# Patient Record
Sex: FEMALE | Race: WHITE | NOT HISPANIC OR LATINO | URBAN - METROPOLITAN AREA
[De-identification: names, ages, dates, MRNs, and addresses within clinical notes are randomized per-mention and may not be internally consistent; named-entity substitution may affect disease eponyms.]

---

## 2019-11-18 ENCOUNTER — APPOINTMENT (RX ONLY)
Dept: URBAN - METROPOLITAN AREA CLINIC 74 | Facility: CLINIC | Age: 64
Setting detail: DERMATOLOGY
End: 2019-11-18

## 2019-11-18 ENCOUNTER — APPOINTMENT (RX ONLY)
Dept: URBAN - METROPOLITAN AREA CLINIC 73 | Facility: CLINIC | Age: 64
Setting detail: DERMATOLOGY
End: 2019-11-18

## 2019-11-18 DIAGNOSIS — L65.9 NONSCARRING HAIR LOSS, UNSPECIFIED: ICD-10-CM

## 2019-11-18 DIAGNOSIS — R21 RASH AND OTHER NONSPECIFIC SKIN ERUPTION: ICD-10-CM

## 2019-11-18 PROBLEM — E78.5 HYPERLIPIDEMIA, UNSPECIFIED: Status: ACTIVE | Noted: 2019-11-18

## 2019-11-18 PROBLEM — I10 ESSENTIAL (PRIMARY) HYPERTENSION: Status: ACTIVE | Noted: 2019-11-18

## 2019-11-18 PROBLEM — K21.9 GASTRO-ESOPHAGEAL REFLUX DISEASE WITHOUT ESOPHAGITIS: Status: ACTIVE | Noted: 2019-11-18

## 2019-11-18 PROBLEM — M12.9 ARTHROPATHY, UNSPECIFIED: Status: ACTIVE | Noted: 2019-11-18

## 2019-11-18 PROCEDURE — 11105 PUNCH BX SKIN EA SEP/ADDL: CPT

## 2019-11-18 PROCEDURE — 11104 PUNCH BX SKIN SINGLE LESION: CPT

## 2019-11-18 PROCEDURE — ? BIOPSY BY PUNCH METHOD

## 2019-11-18 PROCEDURE — ? TREATMENT REGIMEN

## 2019-11-18 PROCEDURE — ? COUNSELING

## 2019-11-18 PROCEDURE — 99202 OFFICE O/P NEW SF 15 MIN: CPT | Mod: 25

## 2019-11-18 PROCEDURE — ? PRESCRIPTION

## 2019-11-18 RX ORDER — CLOBETASOL PROPIONATE 0.5 MG/ML
FTU SOLUTION TOPICAL BID
Qty: 1 | Refills: 1 | Status: ERX | COMMUNITY
Start: 2019-11-18

## 2019-11-18 RX ADMIN — CLOBETASOL PROPIONATE FTU: 0.5 SOLUTION TOPICAL at 00:00

## 2019-11-18 ASSESSMENT — LOCATION SIMPLE DESCRIPTION DERM
LOCATION SIMPLE: RIGHT ELBOW
LOCATION SIMPLE: LEFT ELBOW
LOCATION SIMPLE: RIGHT SCALP
LOCATION SIMPLE: LEFT POSTERIOR THIGH
LOCATION SIMPLE: LEFT POSTERIOR THIGH
LOCATION SIMPLE: LEFT SCALP
LOCATION SIMPLE: LEFT ELBOW
LOCATION SIMPLE: RIGHT ELBOW
LOCATION SIMPLE: RIGHT SCALP
LOCATION SIMPLE: LEFT SCALP

## 2019-11-18 ASSESSMENT — LOCATION DETAILED DESCRIPTION DERM
LOCATION DETAILED: RIGHT CENTRAL FRONTAL SCALP
LOCATION DETAILED: RIGHT ELBOW
LOCATION DETAILED: LEFT ELBOW
LOCATION DETAILED: LEFT CENTRAL FRONTAL SCALP
LOCATION DETAILED: RIGHT ELBOW
LOCATION DETAILED: LEFT CENTRAL FRONTAL SCALP
LOCATION DETAILED: LEFT PROXIMAL POSTERIOR THIGH
LOCATION DETAILED: RIGHT CENTRAL FRONTAL SCALP
LOCATION DETAILED: LEFT PROXIMAL POSTERIOR THIGH
LOCATION DETAILED: LEFT ELBOW

## 2019-11-18 ASSESSMENT — LOCATION ZONE DERM
LOCATION ZONE: ARM
LOCATION ZONE: LEG
LOCATION ZONE: SCALP
LOCATION ZONE: ARM
LOCATION ZONE: SCALP
LOCATION ZONE: LEG

## 2019-11-18 NOTE — PROCEDURE: BIOPSY BY PUNCH METHOD
Additional Anesthesia Volume In Cc (Will Not Render If 0): 0
Epidermal Sutures: 4-0 Ethilon
Punch Size In Mm: 4
Biopsy Type: H and E
Render Post-Care Instructions In Note?: no
Hemostasis: None
Anesthesia Type: 1% lidocaine with epinephrine
Detail Level: Detailed
Post-Care Instructions: I reviewed with the patient in detail post-care instructions. Patient is to keep the biopsy site dry overnight, and then apply bacitracin twice daily until healed. Patient may apply hydrogen peroxide soaks to remove any crusting.
Home Suture Removal Text: Patient was provided a home suture removal kit and will remove their sutures at home.  If they have any questions or difficulties they will call the office.
Wound Care: Vaniply
Suture Removal: 10 days
Billing Type: Third-Party Bill
Was A Bandage Applied: Yes
Dressing: bandage
Consent: Written consent was obtained and risks were reviewed including but not limited to scarring, infection, bleeding, scabbing, incomplete removal, nerve damage and allergy to anesthesia.
Anesthesia Volume In Cc: 0.5
Path Notes (To The Dermatopathologist): For vertical sectioning
Path Notes (To The Dermatopathologist): For horizontal sectioning
Notification Instructions: Patient will be notified of biopsy results. However, patient instructed to call the office if not contacted within 2 weeks.

## 2019-11-18 NOTE — PROCEDURE: BIOPSY BY PUNCH METHOD
Patient Will Remove Sutures At Home?: No
Lab: [id number]
X Depth Of Punch In Cm (Optional): 0
Billing Type: Third-Party Bill
Epidermal Sutures: 4-0 Ethilon
Lab Facility: [id number]
Biopsy Type: H and E
Post-Care Instructions: I reviewed with the patient in detail post-care instructions. Patient is to keep the biopsy site dry overnight, and then apply bacitracin twice daily until healed. Patient may apply hydrogen peroxide soaks to remove any crusting.
Was A Bandage Applied: Yes
Hemostasis: None
Dressing: bandage
Path Notes (To The Dermatopathologist): For horizontal sectioning
Consent: Written consent was obtained and risks were reviewed including but not limited to scarring, infection, bleeding, scabbing, incomplete removal, nerve damage and allergy to anesthesia.
Notification Instructions: Patient will be notified of biopsy results. However, patient instructed to call the office if not contacted within 2 weeks.
Home Suture Removal Text: Patient was provided a home suture removal kit and will remove their sutures at home.  If they have any questions or difficulties they will call the office.
Anesthesia Type: 1% lidocaine with epinephrine
Punch Size In Mm: 4
Anesthesia Volume In Cc: 0.5
Wound Care: Vaniply
Suture Removal: 10 days
Detail Level: Detailed
Path Notes (To The Dermatopathologist): For vertical sectioning

## 2019-11-18 NOTE — PROCEDURE: TREATMENT REGIMEN
Detail Level: Simple
Initiate Treatment: Vaniply QD-BID
Initiate Treatment: Biopsy x2 for H&E for horizontal and vertical sectioning\\nClobetasol solution BID
Continue Regimen: Clobetasol ointment BID
Plan: History of alopecia areata but these changes started ~1yr prior and are not consistent with AA. No itching, pain, or sensitivity. Concern for scarring hair loss discussed, will wait for biopsy, clinically consistent with FFP. \\n\\nPatient amidst eruption of multiple health challenges including herpes zoster on R arm, pancreatitis (prednisone induced?), oral ulcers/blisters (to be biopsied by periodontist this week). Had negative Sjogren’s workup with [place] rheum last year. No known autoimmune condition. Reviewed that constellation of autoimmune symptoms may warrant broader workup with rheum pending biopsy results from scalp and oral mucosa. \\n\\nBriefly reviewed PO antibiotics, plaquenil, xtrac laser, topicals for scarring hair loss - will taper therapy pending path.
Plan: Reviewed findings, pt is to modify regimen above and call with questions or concerns. Distribution consistent with psoriasis with exception of buttocks. No previous psoriatic diagnosis

## 2019-11-18 NOTE — PROCEDURE: TREATMENT REGIMEN
Plan: Reviewed findings, pt is to modify regimen above and call with questions or concerns. Distribution consistent with psoriasis with exception of buttocks. No previous psoriatic diagnosis
Initiate Treatment: Vaniply QD-BID
Continue Regimen: Clobetasol ointment BID
Detail Level: Simple
Plan: History of alopecia areata but these changes started ~1yr prior and are not consistent with AA. No itching, pain, or sensitivity. Concern for scarring hair loss discussed, will wait for biopsy, clinically consistent with FFP. \\n\\nPatient amidst eruption of multiple health challenges including herpes zoster on R arm, pancreatitis (prednisone induced?), oral ulcers/blisters (to be biopsied by periodontist this week). Had negative Sjogren’s workup with [place] rheum last year. No known autoimmune condition. Reviewed that constellation of autoimmune symptoms may warrant broader workup with rheum pending biopsy results from scalp and oral mucosa. \\n\\nBriefly reviewed PO antibiotics, plaquenil, xtrac laser, topicals for scarring hair loss - will taper therapy pending path.
Initiate Treatment: Biopsy x2 for H&E for horizontal and vertical sectioning\\nClobetasol solution BID

## 2019-11-25 ENCOUNTER — APPOINTMENT (RX ONLY)
Dept: URBAN - METROPOLITAN AREA CLINIC 73 | Facility: CLINIC | Age: 64
Setting detail: DERMATOLOGY
End: 2019-11-25

## 2019-11-25 ENCOUNTER — APPOINTMENT (RX ONLY)
Dept: URBAN - METROPOLITAN AREA CLINIC 74 | Facility: CLINIC | Age: 64
Setting detail: DERMATOLOGY
End: 2019-11-25

## 2019-11-25 DIAGNOSIS — L66.89 OTHER CICATRICIAL ALOPECIA: ICD-10-CM

## 2019-11-25 DIAGNOSIS — L66.8 OTHER CICATRICIAL ALOPECIA: ICD-10-CM

## 2019-11-25 DIAGNOSIS — R21 RASH AND OTHER NONSPECIFIC SKIN ERUPTION: ICD-10-CM | Status: STABLE

## 2019-11-25 PROBLEM — K21.9 GASTRO-ESOPHAGEAL REFLUX DISEASE WITHOUT ESOPHAGITIS: Status: ACTIVE | Noted: 2019-11-25

## 2019-11-25 PROCEDURE — 99213 OFFICE O/P EST LOW 20 MIN: CPT

## 2019-11-25 PROCEDURE — ? TREATMENT REGIMEN

## 2019-11-25 PROCEDURE — ? COUNSELING

## 2019-11-25 ASSESSMENT — LOCATION ZONE DERM
LOCATION ZONE: SCALP
LOCATION ZONE: FACE
LOCATION ZONE: LEG
LOCATION ZONE: LEG
LOCATION ZONE: SCALP
LOCATION ZONE: ARM
LOCATION ZONE: ARM
LOCATION ZONE: FACE

## 2019-11-25 ASSESSMENT — LOCATION DETAILED DESCRIPTION DERM
LOCATION DETAILED: LEFT PROXIMAL POSTERIOR THIGH
LOCATION DETAILED: LEFT ELBOW
LOCATION DETAILED: LEFT CENTRAL FRONTAL SCALP
LOCATION DETAILED: LEFT ELBOW
LOCATION DETAILED: LEFT CENTRAL FRONTAL SCALP
LOCATION DETAILED: RIGHT SUPERIOR FOREHEAD
LOCATION DETAILED: RIGHT SUPERIOR FOREHEAD
LOCATION DETAILED: LEFT PROXIMAL POSTERIOR THIGH
LOCATION DETAILED: RIGHT ELBOW
LOCATION DETAILED: RIGHT ELBOW

## 2019-11-25 ASSESSMENT — LOCATION SIMPLE DESCRIPTION DERM
LOCATION SIMPLE: LEFT ELBOW
LOCATION SIMPLE: LEFT POSTERIOR THIGH
LOCATION SIMPLE: LEFT SCALP
LOCATION SIMPLE: RIGHT ELBOW
LOCATION SIMPLE: RIGHT FOREHEAD
LOCATION SIMPLE: LEFT ELBOW
LOCATION SIMPLE: RIGHT FOREHEAD
LOCATION SIMPLE: LEFT SCALP
LOCATION SIMPLE: RIGHT ELBOW
LOCATION SIMPLE: LEFT POSTERIOR THIGH

## 2019-11-25 NOTE — PROCEDURE: TREATMENT REGIMEN
Detail Level: Simple
Continue Regimen: Clobetasol ointment BID PRN\\nVaniply
Continue Regimen: Clobetasol solution to scalp and in into hair line
Plan: Reviewed diagnosis, etiology and treatment options with patient in depth including but not limited to: monitoring, topical steroids, intralesional steroid, xtrac laser, PO tetracyclines, and plaquenil. Reviewed that it will continue to recede/progress. Pt elects topicals only at this time. Advised to call if changes her mind and would like any further therapy.  recheck in 6 months, sooner PRN
Plan: Much improved since last visit, pt pleased with results. Continue regimen above and call with questions or concerns. Distribution consistent with psoriasis with exception of buttocks. No previous psoriatic diagnosis

## 2019-11-25 NOTE — PROCEDURE: TREATMENT REGIMEN
Plan: Much improved since last visit, pt pleased with results. Continue regimen above and call with questions or concerns. Distribution consistent with psoriasis with exception of buttocks. No previous psoriatic diagnosis
Continue Regimen: Clobetasol ointment BID PRN\\nVaniply
Detail Level: Simple
Continue Regimen: Clobetasol solution to scalp and in into hair line
Plan: Reviewed diagnosis, etiology and treatment options with patient in depth including but not limited to: monitoring, topical steroids, intralesional steroid, xtrac laser, PO tetracyclines, and plaquenil. Reviewed that it will continue to recede/progress. Pt elects topicals only at this time. Advised to call if changes her mind and would like any further therapy.  recheck in 6 months, sooner PRN

## 2020-10-14 ENCOUNTER — RX ONLY (OUTPATIENT)
Age: 65
Setting detail: RX ONLY
End: 2020-10-14

## 2020-10-14 ENCOUNTER — APPOINTMENT (RX ONLY)
Dept: URBAN - METROPOLITAN AREA CLINIC 74 | Facility: CLINIC | Age: 65
Setting detail: DERMATOLOGY
End: 2020-10-14

## 2020-10-14 ENCOUNTER — APPOINTMENT (RX ONLY)
Dept: URBAN - METROPOLITAN AREA CLINIC 73 | Facility: CLINIC | Age: 65
Setting detail: DERMATOLOGY
End: 2020-10-14

## 2020-10-14 DIAGNOSIS — L81.4 OTHER MELANIN HYPERPIGMENTATION: ICD-10-CM

## 2020-10-14 DIAGNOSIS — L40.0 PSORIASIS VULGARIS: ICD-10-CM

## 2020-10-14 DIAGNOSIS — L72.0 EPIDERMAL CYST: ICD-10-CM

## 2020-10-14 DIAGNOSIS — L82.1 OTHER SEBORRHEIC KERATOSIS: ICD-10-CM

## 2020-10-14 DIAGNOSIS — D18.0 HEMANGIOMA: ICD-10-CM

## 2020-10-14 DIAGNOSIS — Z71.89 OTHER SPECIFIED COUNSELING: ICD-10-CM

## 2020-10-14 DIAGNOSIS — D22 MELANOCYTIC NEVI: ICD-10-CM

## 2020-10-14 DIAGNOSIS — L90.0 LICHEN SCLEROSUS ET ATROPHICUS: ICD-10-CM

## 2020-10-14 DIAGNOSIS — L66.1 LICHEN PLANOPILARIS: ICD-10-CM

## 2020-10-14 DIAGNOSIS — L66.11 CLASSIC LICHEN PLANOPILARIS: ICD-10-CM

## 2020-10-14 PROBLEM — D22.62 MELANOCYTIC NEVI OF LEFT UPPER LIMB, INCLUDING SHOULDER: Status: ACTIVE | Noted: 2020-10-14

## 2020-10-14 PROBLEM — D22.72 MELANOCYTIC NEVI OF LEFT LOWER LIMB, INCLUDING HIP: Status: ACTIVE | Noted: 2020-10-14

## 2020-10-14 PROBLEM — D22.71 MELANOCYTIC NEVI OF RIGHT LOWER LIMB, INCLUDING HIP: Status: ACTIVE | Noted: 2020-10-14

## 2020-10-14 PROBLEM — D18.01 HEMANGIOMA OF SKIN AND SUBCUTANEOUS TISSUE: Status: ACTIVE | Noted: 2020-10-14

## 2020-10-14 PROBLEM — D22.5 MELANOCYTIC NEVI OF TRUNK: Status: ACTIVE | Noted: 2020-10-14

## 2020-10-14 PROBLEM — M12.9 ARTHROPATHY, UNSPECIFIED: Status: ACTIVE | Noted: 2020-10-14

## 2020-10-14 PROBLEM — D22.61 MELANOCYTIC NEVI OF RIGHT UPPER LIMB, INCLUDING SHOULDER: Status: ACTIVE | Noted: 2020-10-14

## 2020-10-14 PROBLEM — I10 ESSENTIAL (PRIMARY) HYPERTENSION: Status: ACTIVE | Noted: 2020-10-14

## 2020-10-14 PROCEDURE — ? TREATMENT REGIMEN

## 2020-10-14 PROCEDURE — 99214 OFFICE O/P EST MOD 30 MIN: CPT

## 2020-10-14 PROCEDURE — ? PRESCRIPTION

## 2020-10-14 PROCEDURE — ? COUNSELING

## 2020-10-14 RX ORDER — HALOBETASOL PROPIONATE AND TAZAROTENE .1; .45 MG/G; MG/G
FTU LOTION TOPICAL BID
Qty: 1 | Refills: 2 | Status: ERX | COMMUNITY
Start: 2020-10-14

## 2020-10-14 RX ORDER — CLOBETASOL PROPIONATE 0.5 MG/ML
FTU SOLUTION TOPICAL BID
Qty: 1 | Refills: 1 | Status: ERX

## 2020-10-14 RX ADMIN — HALOBETASOL PROPIONATE AND TAZAROTENE FTU: .1; .45 LOTION TOPICAL at 00:00

## 2020-10-14 ASSESSMENT — LOCATION DETAILED DESCRIPTION DERM
LOCATION DETAILED: RIGHT DISTAL POSTERIOR UPPER ARM
LOCATION DETAILED: RIGHT LATERAL TRAPEZIAL NECK
LOCATION DETAILED: LEFT DISTAL POSTERIOR THIGH
LOCATION DETAILED: RIGHT PROXIMAL ULNAR THUMB
LOCATION DETAILED: LEFT VENTRAL PROXIMAL FOREARM
LOCATION DETAILED: PERIUMBILICAL SKIN
LOCATION DETAILED: LEFT KNEE
LOCATION DETAILED: LEFT SUPERIOR UPPER BACK
LOCATION DETAILED: RIGHT KNEE
LOCATION DETAILED: LEFT SUPERIOR MEDIAL MIDBACK
LOCATION DETAILED: GLUTEAL CLEFT
LOCATION DETAILED: RIGHT PROXIMAL PRETIBIAL REGION
LOCATION DETAILED: EPIGASTRIC SKIN
LOCATION DETAILED: LEFT ANTERIOR DISTAL THIGH
LOCATION DETAILED: RIGHT SUPERIOR FOREHEAD
LOCATION DETAILED: LEFT ANTERIOR PROXIMAL THIGH
LOCATION DETAILED: LEFT RADIAL PALM
LOCATION DETAILED: LEFT SUPERIOR LATERAL LOWER BACK
LOCATION DETAILED: LEFT ULNAR DORSAL HAND
LOCATION DETAILED: LEFT PROXIMAL CALF
LOCATION DETAILED: RIGHT LATERAL CANTHUS
LOCATION DETAILED: RIGHT ELBOW
LOCATION DETAILED: LEFT INFERIOR MEDIAL FOREHEAD
LOCATION DETAILED: RIGHT INFERIOR CENTRAL MALAR CHEEK
LOCATION DETAILED: LEFT SUPERIOR MEDIAL UPPER BACK
LOCATION DETAILED: LEFT ULNAR DORSAL HAND
LOCATION DETAILED: LEFT ELBOW
LOCATION DETAILED: LEFT KNEE
LOCATION DETAILED: LEFT DISTAL POSTERIOR UPPER ARM
LOCATION DETAILED: LEFT PROXIMAL PRETIBIAL REGION
LOCATION DETAILED: RIGHT LATERAL ABDOMEN
LOCATION DETAILED: RIGHT PROXIMAL ULNAR THUMB
LOCATION DETAILED: LEFT INFERIOR MEDIAL FOREHEAD
LOCATION DETAILED: RIGHT KNEE
LOCATION DETAILED: RIGHT INFERIOR CENTRAL MALAR CHEEK
LOCATION DETAILED: LEFT SUPERIOR MEDIAL FOREHEAD
LOCATION DETAILED: RIGHT DISTAL POSTERIOR UPPER ARM
LOCATION DETAILED: LEFT DISTAL POSTERIOR UPPER ARM
LOCATION DETAILED: LEFT ANTERIOR DISTAL THIGH
LOCATION DETAILED: LEFT SUPERIOR MEDIAL LOWER BACK
LOCATION DETAILED: LEFT SUPERIOR LATERAL LOWER BACK
LOCATION DETAILED: LEFT SUPERIOR MEDIAL MIDBACK
LOCATION DETAILED: RIGHT DISTAL POSTERIOR THIGH
LOCATION DETAILED: RIGHT SUPERIOR MEDIAL UPPER BACK
LOCATION DETAILED: RIGHT DISTAL PRETIBIAL REGION
LOCATION DETAILED: RIGHT ANTERIOR DISTAL THIGH
LOCATION DETAILED: RIGHT DISTAL POSTERIOR THIGH
LOCATION DETAILED: LEFT SUPERIOR MEDIAL FOREHEAD
LOCATION DETAILED: LEFT ANTERIOR DISTAL UPPER ARM
LOCATION DETAILED: RIGHT LABIA MINORA
LOCATION DETAILED: LEFT SUPERIOR UPPER BACK
LOCATION DETAILED: RIGHT ANTERIOR DISTAL UPPER ARM
LOCATION DETAILED: PERIUMBILICAL SKIN
LOCATION DETAILED: RIGHT THENAR EMINENCE
LOCATION DETAILED: RIGHT PROXIMAL PRETIBIAL REGION
LOCATION DETAILED: RIGHT SUPERIOR MEDIAL UPPER BACK
LOCATION DETAILED: LEFT PROXIMAL PRETIBIAL REGION
LOCATION DETAILED: LEFT MID PREAURICULAR CHEEK
LOCATION DETAILED: RIGHT VENTRAL PROXIMAL FOREARM
LOCATION DETAILED: RIGHT MID-UPPER BACK
LOCATION DETAILED: RIGHT MID-UPPER BACK
LOCATION DETAILED: LEFT ELBOW
LOCATION DETAILED: RIGHT LABIA MINORA
LOCATION DETAILED: RIGHT ANTERIOR DISTAL THIGH
LOCATION DETAILED: LEFT PROXIMAL POSTERIOR UPPER ARM
LOCATION DETAILED: LEFT LABIA MINORA
LOCATION DETAILED: LEFT MID PREAURICULAR CHEEK
LOCATION DETAILED: LEFT ANTERIOR PROXIMAL THIGH
LOCATION DETAILED: LEFT LABIA MINORA
LOCATION DETAILED: EPIGASTRIC SKIN
LOCATION DETAILED: UPPER STERNUM
LOCATION DETAILED: LEFT PROXIMAL POSTERIOR UPPER ARM
LOCATION DETAILED: RIGHT LATERAL ABDOMEN
LOCATION DETAILED: LEFT SUPERIOR MEDIAL LOWER BACK
LOCATION DETAILED: RIGHT ANTERIOR SHOULDER
LOCATION DETAILED: LEFT SUPERIOR FOREHEAD
LOCATION DETAILED: LEFT DISTAL POSTERIOR THIGH
LOCATION DETAILED: LEFT SUPERIOR FOREHEAD
LOCATION DETAILED: GLUTEAL CLEFT
LOCATION DETAILED: RIGHT THENAR EMINENCE
LOCATION DETAILED: RIGHT ANTERIOR DISTAL UPPER ARM
LOCATION DETAILED: LEFT SUPERIOR MEDIAL UPPER BACK
LOCATION DETAILED: RIGHT DISTAL PRETIBIAL REGION
LOCATION DETAILED: RIGHT LATERAL CANTHUS
LOCATION DETAILED: RIGHT VENTRAL PROXIMAL FOREARM
LOCATION DETAILED: LEFT PROXIMAL CALF
LOCATION DETAILED: RIGHT LATERAL TRAPEZIAL NECK
LOCATION DETAILED: LEFT VENTRAL PROXIMAL FOREARM
LOCATION DETAILED: RIGHT SUPERIOR FOREHEAD
LOCATION DETAILED: RIGHT POPLITEAL SKIN
LOCATION DETAILED: LEFT LATERAL ABDOMEN
LOCATION DETAILED: RIGHT ELBOW
LOCATION DETAILED: UPPER STERNUM
LOCATION DETAILED: LEFT ANTERIOR DISTAL UPPER ARM
LOCATION DETAILED: RIGHT POPLITEAL SKIN
LOCATION DETAILED: LEFT LATERAL ABDOMEN
LOCATION DETAILED: LEFT RADIAL PALM
LOCATION DETAILED: RIGHT ANTERIOR SHOULDER

## 2020-10-14 ASSESSMENT — LOCATION ZONE DERM
LOCATION ZONE: FINGER
LOCATION ZONE: EYELID
LOCATION ZONE: LEG
LOCATION ZONE: VULVA
LOCATION ZONE: TRUNK
LOCATION ZONE: HAND
LOCATION ZONE: ARM
LOCATION ZONE: VULVA
LOCATION ZONE: HAND
LOCATION ZONE: FINGER
LOCATION ZONE: NECK
LOCATION ZONE: ARM
LOCATION ZONE: LEG
LOCATION ZONE: NECK
LOCATION ZONE: FACE
LOCATION ZONE: TRUNK
LOCATION ZONE: EYELID
LOCATION ZONE: FACE

## 2020-10-14 ASSESSMENT — LOCATION SIMPLE DESCRIPTION DERM
LOCATION SIMPLE: LEFT FOREARM
LOCATION SIMPLE: LEFT LOWER BACK
LOCATION SIMPLE: LEFT THIGH
LOCATION SIMPLE: RIGHT THUMB
LOCATION SIMPLE: RIGHT POSTERIOR THIGH
LOCATION SIMPLE: LEFT CALF
LOCATION SIMPLE: RIGHT FOREHEAD
LOCATION SIMPLE: LEFT UPPER BACK
LOCATION SIMPLE: RIGHT KNEE
LOCATION SIMPLE: RIGHT HAND
LOCATION SIMPLE: LEFT KNEE
LOCATION SIMPLE: LEFT POSTERIOR UPPER ARM
LOCATION SIMPLE: RIGHT SHOULDER
LOCATION SIMPLE: POSTERIOR NECK
LOCATION SIMPLE: RIGHT ELBOW
LOCATION SIMPLE: RIGHT POSTERIOR THIGH
LOCATION SIMPLE: GLUTEAL CLEFT
LOCATION SIMPLE: RIGHT SHOULDER
LOCATION SIMPLE: RIGHT EYELID
LOCATION SIMPLE: RIGHT FOREARM
LOCATION SIMPLE: VULVA
LOCATION SIMPLE: LEFT UPPER ARM
LOCATION SIMPLE: RIGHT UPPER BACK
LOCATION SIMPLE: LEFT UPPER ARM
LOCATION SIMPLE: LEFT FOREHEAD
LOCATION SIMPLE: LEFT PRETIBIAL REGION
LOCATION SIMPLE: LEFT ELBOW
LOCATION SIMPLE: RIGHT CHEEK
LOCATION SIMPLE: RIGHT FOREHEAD
LOCATION SIMPLE: GLUTEAL CLEFT
LOCATION SIMPLE: ABDOMEN
LOCATION SIMPLE: LEFT PRETIBIAL REGION
LOCATION SIMPLE: LEFT POSTERIOR UPPER ARM
LOCATION SIMPLE: RIGHT CHEEK
LOCATION SIMPLE: RIGHT ELBOW
LOCATION SIMPLE: RIGHT KNEE
LOCATION SIMPLE: RIGHT POSTERIOR UPPER ARM
LOCATION SIMPLE: LEFT CALF
LOCATION SIMPLE: LEFT KNEE
LOCATION SIMPLE: LEFT HAND
LOCATION SIMPLE: RIGHT PRETIBIAL REGION
LOCATION SIMPLE: RIGHT THUMB
LOCATION SIMPLE: RIGHT THIGH
LOCATION SIMPLE: LEFT HAND
LOCATION SIMPLE: LEFT LOWER BACK
LOCATION SIMPLE: LEFT POSTERIOR THIGH
LOCATION SIMPLE: CHEST
LOCATION SIMPLE: CHEST
LOCATION SIMPLE: RIGHT POPLITEAL SKIN
LOCATION SIMPLE: RIGHT THIGH
LOCATION SIMPLE: RIGHT UPPER ARM
LOCATION SIMPLE: RIGHT POPLITEAL SKIN
LOCATION SIMPLE: RIGHT UPPER BACK
LOCATION SIMPLE: RIGHT FOREARM
LOCATION SIMPLE: LEFT THIGH
LOCATION SIMPLE: LEFT POSTERIOR THIGH
LOCATION SIMPLE: RIGHT PRETIBIAL REGION
LOCATION SIMPLE: LEFT UPPER BACK
LOCATION SIMPLE: POSTERIOR NECK
LOCATION SIMPLE: LEFT CHEEK
LOCATION SIMPLE: LEFT FOREHEAD
LOCATION SIMPLE: VULVA
LOCATION SIMPLE: RIGHT EYELID
LOCATION SIMPLE: RIGHT POSTERIOR UPPER ARM
LOCATION SIMPLE: ABDOMEN
LOCATION SIMPLE: LEFT FOREARM
LOCATION SIMPLE: RIGHT HAND
LOCATION SIMPLE: LEFT CHEEK
LOCATION SIMPLE: LEFT ELBOW
LOCATION SIMPLE: RIGHT UPPER ARM

## 2020-10-14 NOTE — PROCEDURE: TREATMENT REGIMEN
Detail Level: Simple
Continue Regimen: Clobetasol as prescribed by OB/GYN
Initiate Treatment: Restart clobetasol solution bid
Action 3: Continue
Plan: Managed by OB/GYN. Well controlled at this time.
Continue Regimen: Clobesol bid x 2 weeks PRN flares to psoriasis on lower legs
Plan: PA for XTRAC
Initiate Regimen: Duobrii (if insurance will cover) to palms
Plan: Initiate PA for XTRAC laser BIW.

## 2020-10-14 NOTE — PROCEDURE: TREATMENT REGIMEN
Action 1: Continue
Initiate Regimen: Duobrii (if insurance will cover) to palms
Plan: Initiate PA for XTRAC laser BIW.
Continue Regimen: Clobesol bid x 2 weeks PRN flares to psoriasis on lower legs
Detail Level: Simple
Plan: PA for XTRAC
Initiate Treatment: Restart clobetasol solution bid
Plan: Managed by OB/GYN. Well controlled at this time.
Continue Regimen: Clobetasol as prescribed by OB/GYN

## 2020-10-16 ENCOUNTER — RX ONLY (OUTPATIENT)
Age: 65
Setting detail: RX ONLY
End: 2020-10-16

## 2020-10-16 RX ORDER — CALCIPOTRIENE AND BETAMETHASONE DIPROPIONATE 50; .5 UG/G; MG/G
FTU OINTMENT TOPICAL BID
Qty: 1 | Refills: 2 | Status: ERX | COMMUNITY
Start: 2020-10-16

## 2020-10-20 ENCOUNTER — APPOINTMENT (RX ONLY)
Dept: URBAN - METROPOLITAN AREA CLINIC 73 | Facility: CLINIC | Age: 65
Setting detail: DERMATOLOGY
End: 2020-10-20

## 2020-10-20 ENCOUNTER — RX ONLY (OUTPATIENT)
Age: 65
Setting detail: RX ONLY
End: 2020-10-20

## 2020-10-20 ENCOUNTER — APPOINTMENT (RX ONLY)
Dept: URBAN - METROPOLITAN AREA CLINIC 74 | Facility: CLINIC | Age: 65
Setting detail: DERMATOLOGY
End: 2020-10-20

## 2020-10-20 DIAGNOSIS — Z71.89 OTHER SPECIFIED COUNSELING: ICD-10-CM

## 2020-10-20 DIAGNOSIS — L40.0 PSORIASIS VULGARIS: ICD-10-CM

## 2020-10-20 PROCEDURE — ? XTRAC LASER

## 2020-10-20 PROCEDURE — 96920 EXCIMER LSR PSRIASIS<250SQCM: CPT

## 2020-10-20 PROCEDURE — ? COUNSELING

## 2020-10-20 PROCEDURE — ? TREATMENT REGIMEN

## 2020-10-20 RX ORDER — PHARMACY COMPOUNDING ACCESSORY
FTU EACH MISCELLANEOUS BID
Qty: 60 | Refills: 3 | Status: ERX | COMMUNITY
Start: 2020-10-20

## 2020-10-20 ASSESSMENT — LOCATION ZONE DERM
LOCATION ZONE: LEG
LOCATION ZONE: ARM
LOCATION ZONE: HAND
LOCATION ZONE: TRUNK
LOCATION ZONE: LEG
LOCATION ZONE: ARM
LOCATION ZONE: HAND
LOCATION ZONE: TRUNK

## 2020-10-20 ASSESSMENT — LOCATION DETAILED DESCRIPTION DERM
LOCATION DETAILED: LEFT ELBOW
LOCATION DETAILED: RIGHT ELBOW
LOCATION DETAILED: LEFT ELBOW
LOCATION DETAILED: LEFT THENAR EMINENCE
LOCATION DETAILED: RIGHT ULNAR PALM
LOCATION DETAILED: LEFT KNEE
LOCATION DETAILED: LEFT LATERAL ABDOMEN
LOCATION DETAILED: RIGHT THENAR EMINENCE
LOCATION DETAILED: RIGHT THENAR EMINENCE
LOCATION DETAILED: RIGHT KNEE
LOCATION DETAILED: RIGHT DISTAL POSTERIOR UPPER ARM
LOCATION DETAILED: LEFT KNEE
LOCATION DETAILED: LEFT LATERAL ABDOMEN
LOCATION DETAILED: RIGHT DISTAL POSTERIOR UPPER ARM
LOCATION DETAILED: LEFT THENAR EMINENCE
LOCATION DETAILED: RIGHT ULNAR PALM
LOCATION DETAILED: RIGHT KNEE
LOCATION DETAILED: RIGHT ELBOW
LOCATION DETAILED: LEFT RADIAL PALM
LOCATION DETAILED: LEFT RADIAL PALM

## 2020-10-20 ASSESSMENT — LOCATION SIMPLE DESCRIPTION DERM
LOCATION SIMPLE: LEFT ELBOW
LOCATION SIMPLE: LEFT HAND
LOCATION SIMPLE: RIGHT ELBOW
LOCATION SIMPLE: LEFT KNEE
LOCATION SIMPLE: LEFT KNEE
LOCATION SIMPLE: LEFT HAND
LOCATION SIMPLE: ABDOMEN
LOCATION SIMPLE: RIGHT KNEE
LOCATION SIMPLE: RIGHT KNEE
LOCATION SIMPLE: RIGHT POSTERIOR UPPER ARM
LOCATION SIMPLE: RIGHT ELBOW
LOCATION SIMPLE: LEFT ELBOW
LOCATION SIMPLE: RIGHT HAND
LOCATION SIMPLE: ABDOMEN
LOCATION SIMPLE: RIGHT HAND
LOCATION SIMPLE: RIGHT POSTERIOR UPPER ARM

## 2020-10-20 NOTE — PROCEDURE: XTRAC LASER
Dose Setting #1 (Mj/Cm2): 400
Location #2: knees
Post-Care Instructions: I reviewed with the patient in detail post-care instructions. Patient should stay away from the sun and wear sun protection until treated areas are fully healed.
Location #3: elbows
Total Pulses (Will Not Render If 0): 0
Comments: New start 400
Treatment Number: 1
Total Square Area In Cm2 (Required For Proper Billing): 125
Location #1: hands
Consent: Written consent obtained, risks reviewed including but not limited to crusting, scabbing, blistering, scarring, darker or lighter pigmentary change, incidental hair removal, bruising, and/or incomplete removal.
Detail Level: Detailed

## 2020-10-20 NOTE — PROCEDURE: XTRAC LASER
Dose Setting #1 (Mj/Cm2): 400
Consent: Written consent obtained, risks reviewed including but not limited to crusting, scabbing, blistering, scarring, darker or lighter pigmentary change, incidental hair removal, bruising, and/or incomplete removal.
Comments: New start 400
Location #1: hands
Total Pulses (Will Not Render If 0): 0
Total Square Area In Cm2 (Required For Proper Billing): 125
Location #3: elbows
Detail Level: Detailed
Post-Care Instructions: I reviewed with the patient in detail post-care instructions. Patient should stay away from the sun and wear sun protection until treated areas are fully healed.
Treatment Number: 1
Location #2: knees

## 2020-10-20 NOTE — PROCEDURE: TREATMENT REGIMEN
Action 4: Continue
Plan: Initiate PA for XTRAC laser BIW.
Initiate Regimen: Duobrii (if insurance will cover) to palms
Detail Level: Simple
Continue Regimen: Clobesol bid x 2 weeks PRN flares to psoriasis on lower legs

## 2020-10-20 NOTE — PROCEDURE: TREATMENT REGIMEN
Action 1: Continue
Initiate Regimen: Duobrii (if insurance will cover) to palms
Plan: Initiate PA for XTRAC laser BIW.
Continue Regimen: Clobesol bid x 2 weeks PRN flares to psoriasis on lower legs
Detail Level: Simple

## 2020-10-27 ENCOUNTER — APPOINTMENT (RX ONLY)
Dept: URBAN - METROPOLITAN AREA CLINIC 73 | Facility: CLINIC | Age: 65
Setting detail: DERMATOLOGY
End: 2020-10-27

## 2020-10-27 ENCOUNTER — APPOINTMENT (RX ONLY)
Dept: URBAN - METROPOLITAN AREA CLINIC 74 | Facility: CLINIC | Age: 65
Setting detail: DERMATOLOGY
End: 2020-10-27

## 2020-10-27 DIAGNOSIS — L40.0 PSORIASIS VULGARIS: ICD-10-CM

## 2020-10-27 PROCEDURE — 96920 EXCIMER LSR PSRIASIS<250SQCM: CPT

## 2020-10-27 PROCEDURE — ? COUNSELING

## 2020-10-27 PROCEDURE — ? XTRAC LASER

## 2020-10-27 ASSESSMENT — LOCATION DETAILED DESCRIPTION DERM
LOCATION DETAILED: LEFT ELBOW
LOCATION DETAILED: RIGHT ULNAR PALM
LOCATION DETAILED: RIGHT THENAR EMINENCE
LOCATION DETAILED: LEFT KNEE
LOCATION DETAILED: RIGHT DISTAL POSTERIOR UPPER ARM
LOCATION DETAILED: RIGHT ULNAR PALM
LOCATION DETAILED: LEFT RADIAL PALM
LOCATION DETAILED: LEFT KNEE
LOCATION DETAILED: RIGHT THENAR EMINENCE
LOCATION DETAILED: RIGHT KNEE
LOCATION DETAILED: RIGHT KNEE
LOCATION DETAILED: LEFT THENAR EMINENCE
LOCATION DETAILED: LEFT ELBOW
LOCATION DETAILED: RIGHT DISTAL POSTERIOR UPPER ARM
LOCATION DETAILED: LEFT THENAR EMINENCE
LOCATION DETAILED: RIGHT ELBOW
LOCATION DETAILED: RIGHT ELBOW
LOCATION DETAILED: LEFT RADIAL PALM

## 2020-10-27 ASSESSMENT — LOCATION ZONE DERM
LOCATION ZONE: LEG
LOCATION ZONE: LEG
LOCATION ZONE: HAND
LOCATION ZONE: ARM
LOCATION ZONE: ARM
LOCATION ZONE: HAND

## 2020-10-27 ASSESSMENT — LOCATION SIMPLE DESCRIPTION DERM
LOCATION SIMPLE: RIGHT KNEE
LOCATION SIMPLE: RIGHT POSTERIOR UPPER ARM
LOCATION SIMPLE: LEFT HAND
LOCATION SIMPLE: RIGHT ELBOW
LOCATION SIMPLE: LEFT ELBOW
LOCATION SIMPLE: LEFT ELBOW
LOCATION SIMPLE: LEFT KNEE
LOCATION SIMPLE: RIGHT HAND
LOCATION SIMPLE: LEFT HAND
LOCATION SIMPLE: RIGHT POSTERIOR UPPER ARM
LOCATION SIMPLE: LEFT KNEE
LOCATION SIMPLE: RIGHT KNEE
LOCATION SIMPLE: RIGHT ELBOW
LOCATION SIMPLE: RIGHT HAND

## 2020-10-27 NOTE — PROCEDURE: XTRAC LASER
Dose Setting #1 (Mj/Cm2): 440
Location #2: knees
Post-Care Instructions: I reviewed with the patient in detail post-care instructions. Patient should stay away from the sun and wear sun protection until treated areas are fully healed.
Location #3: elbows
Total Pulses (Will Not Render If 0): 0
Comments: New start 400
Treatment Number: 2
Location #4: hairline
Total Square Area In Cm2 (Required For Proper Billing): 184
Location #1: hands
Consent: Written consent obtained, risks reviewed including but not limited to crusting, scabbing, blistering, scarring, darker or lighter pigmentary change, incidental hair removal, bruising, and/or incomplete removal.
Detail Level: Detailed

## 2020-10-27 NOTE — PROCEDURE: XTRAC LASER
Dose Setting #1 (Mj/Cm2): 440
Total Square Area In Cm2 (Required For Proper Billing): 184
Comments: New start 400
Detail Level: Detailed
Treatment Number: 2
Consent: Written consent obtained, risks reviewed including but not limited to crusting, scabbing, blistering, scarring, darker or lighter pigmentary change, incidental hair removal, bruising, and/or incomplete removal.
Total Pulses (Will Not Render If 0): 0
Location #1: hands
Location #3: elbows
Post-Care Instructions: I reviewed with the patient in detail post-care instructions. Patient should stay away from the sun and wear sun protection until treated areas are fully healed.
Location #2: knees
Location #4: hairline

## 2020-11-03 ENCOUNTER — APPOINTMENT (RX ONLY)
Dept: URBAN - METROPOLITAN AREA CLINIC 74 | Facility: CLINIC | Age: 65
Setting detail: DERMATOLOGY
End: 2020-11-03

## 2020-11-03 ENCOUNTER — APPOINTMENT (RX ONLY)
Dept: URBAN - METROPOLITAN AREA CLINIC 73 | Facility: CLINIC | Age: 65
Setting detail: DERMATOLOGY
End: 2020-11-03

## 2020-11-03 DIAGNOSIS — L40.0 PSORIASIS VULGARIS: ICD-10-CM

## 2020-11-03 PROCEDURE — ? XTRAC LASER

## 2020-11-03 PROCEDURE — 96920 EXCIMER LSR PSRIASIS<250SQCM: CPT

## 2020-11-03 PROCEDURE — ? COUNSELING

## 2020-11-03 ASSESSMENT — LOCATION SIMPLE DESCRIPTION DERM
LOCATION SIMPLE: RIGHT ELBOW
LOCATION SIMPLE: LEFT ELBOW
LOCATION SIMPLE: LEFT KNEE
LOCATION SIMPLE: LEFT KNEE
LOCATION SIMPLE: LEFT ELBOW
LOCATION SIMPLE: RIGHT HAND
LOCATION SIMPLE: RIGHT ELBOW
LOCATION SIMPLE: RIGHT HAND
LOCATION SIMPLE: RIGHT KNEE
LOCATION SIMPLE: RIGHT POSTERIOR UPPER ARM
LOCATION SIMPLE: LEFT HAND
LOCATION SIMPLE: LEFT HAND
LOCATION SIMPLE: RIGHT POSTERIOR UPPER ARM
LOCATION SIMPLE: RIGHT KNEE

## 2020-11-03 ASSESSMENT — LOCATION DETAILED DESCRIPTION DERM
LOCATION DETAILED: LEFT RADIAL PALM
LOCATION DETAILED: LEFT KNEE
LOCATION DETAILED: LEFT ELBOW
LOCATION DETAILED: RIGHT ULNAR PALM
LOCATION DETAILED: RIGHT THENAR EMINENCE
LOCATION DETAILED: RIGHT THENAR EMINENCE
LOCATION DETAILED: RIGHT KNEE
LOCATION DETAILED: LEFT RADIAL PALM
LOCATION DETAILED: LEFT THENAR EMINENCE
LOCATION DETAILED: RIGHT KNEE
LOCATION DETAILED: RIGHT DISTAL POSTERIOR UPPER ARM
LOCATION DETAILED: RIGHT ULNAR PALM
LOCATION DETAILED: LEFT ELBOW
LOCATION DETAILED: RIGHT ELBOW
LOCATION DETAILED: RIGHT DISTAL POSTERIOR UPPER ARM
LOCATION DETAILED: LEFT KNEE
LOCATION DETAILED: LEFT THENAR EMINENCE
LOCATION DETAILED: RIGHT ELBOW

## 2020-11-03 ASSESSMENT — LOCATION ZONE DERM
LOCATION ZONE: LEG
LOCATION ZONE: HAND
LOCATION ZONE: LEG
LOCATION ZONE: ARM
LOCATION ZONE: HAND
LOCATION ZONE: ARM

## 2020-11-03 NOTE — PROCEDURE: XTRAC LASER
Dose Setting #1 (Mj/Cm2): 490
Location #2: knees
Post-Care Instructions: I reviewed with the patient in detail post-care instructions. Patient should stay away from the sun and wear sun protection until treated areas are fully healed.
Location #3: elbows
Total Pulses (Will Not Render If 0): 0
Treatment Number: 3
Location #4: hairline
Total Square Area In Cm2 (Required For Proper Billing): 92
Location #1: hands
Consent: Written consent obtained, risks reviewed including but not limited to crusting, scabbing, blistering, scarring, darker or lighter pigmentary change, incidental hair removal, bruising, and/or incomplete removal.
Detail Level: Detailed

## 2020-11-03 NOTE — PROCEDURE: XTRAC LASER
Total Square Area In Cm2 (Required For Proper Billing): 92
Location #3: elbows
Post-Care Instructions: I reviewed with the patient in detail post-care instructions. Patient should stay away from the sun and wear sun protection until treated areas are fully healed.
Dose Setting #3 (Mj/Cm2): 490
Detail Level: Detailed
Location #2: knees
Location #4: hairline
Consent: Written consent obtained, risks reviewed including but not limited to crusting, scabbing, blistering, scarring, darker or lighter pigmentary change, incidental hair removal, bruising, and/or incomplete removal.
Location #1: hands
Treatment Number: 3
Total Pulses (Will Not Render If 0): 0

## 2020-11-05 ENCOUNTER — APPOINTMENT (RX ONLY)
Dept: URBAN - METROPOLITAN AREA CLINIC 73 | Facility: CLINIC | Age: 65
Setting detail: DERMATOLOGY
End: 2020-11-05

## 2020-11-05 ENCOUNTER — APPOINTMENT (RX ONLY)
Dept: URBAN - METROPOLITAN AREA CLINIC 74 | Facility: CLINIC | Age: 65
Setting detail: DERMATOLOGY
End: 2020-11-05

## 2020-11-05 DIAGNOSIS — L40.0 PSORIASIS VULGARIS: ICD-10-CM

## 2020-11-05 PROCEDURE — ? COUNSELING

## 2020-11-05 PROCEDURE — ? XTRAC LASER

## 2020-11-05 PROCEDURE — 96920 EXCIMER LSR PSRIASIS<250SQCM: CPT

## 2020-11-05 ASSESSMENT — LOCATION SIMPLE DESCRIPTION DERM
LOCATION SIMPLE: RIGHT HAND
LOCATION SIMPLE: RIGHT ELBOW
LOCATION SIMPLE: LEFT ELBOW
LOCATION SIMPLE: RIGHT HAND
LOCATION SIMPLE: LEFT HAND
LOCATION SIMPLE: RIGHT KNEE
LOCATION SIMPLE: RIGHT ELBOW
LOCATION SIMPLE: RIGHT KNEE
LOCATION SIMPLE: RIGHT POSTERIOR UPPER ARM
LOCATION SIMPLE: RIGHT POSTERIOR UPPER ARM
LOCATION SIMPLE: LEFT ELBOW
LOCATION SIMPLE: LEFT HAND
LOCATION SIMPLE: LEFT KNEE
LOCATION SIMPLE: LEFT KNEE

## 2020-11-05 ASSESSMENT — LOCATION DETAILED DESCRIPTION DERM
LOCATION DETAILED: RIGHT DISTAL POSTERIOR UPPER ARM
LOCATION DETAILED: LEFT RADIAL PALM
LOCATION DETAILED: RIGHT ELBOW
LOCATION DETAILED: LEFT THENAR EMINENCE
LOCATION DETAILED: LEFT KNEE
LOCATION DETAILED: LEFT ELBOW
LOCATION DETAILED: RIGHT ULNAR PALM
LOCATION DETAILED: LEFT KNEE
LOCATION DETAILED: RIGHT KNEE
LOCATION DETAILED: RIGHT DISTAL POSTERIOR UPPER ARM
LOCATION DETAILED: LEFT THENAR EMINENCE
LOCATION DETAILED: LEFT RADIAL PALM
LOCATION DETAILED: RIGHT KNEE
LOCATION DETAILED: RIGHT ELBOW
LOCATION DETAILED: LEFT ELBOW
LOCATION DETAILED: RIGHT ULNAR PALM
LOCATION DETAILED: RIGHT THENAR EMINENCE
LOCATION DETAILED: RIGHT THENAR EMINENCE

## 2020-11-05 ASSESSMENT — LOCATION ZONE DERM
LOCATION ZONE: HAND
LOCATION ZONE: ARM
LOCATION ZONE: LEG
LOCATION ZONE: LEG
LOCATION ZONE: ARM
LOCATION ZONE: HAND

## 2020-11-05 NOTE — PROCEDURE: XTRAC LASER
Dose Setting #1 (Mj/Cm2): 540
Location #2: knees
Post-Care Instructions: I reviewed with the patient in detail post-care instructions. Patient should stay away from the sun and wear sun protection until treated areas are fully healed.
Location #3: elbows
Total Pulses (Will Not Render If 0): 0
Treatment Number: 4
Location #4: hairline
Total Square Area In Cm2 (Required For Proper Billing): 125
Location #1: hands
Consent: Written consent obtained, risks reviewed including but not limited to crusting, scabbing, blistering, scarring, darker or lighter pigmentary change, incidental hair removal, bruising, and/or incomplete removal.
Detail Level: Detailed

## 2020-11-05 NOTE — PROCEDURE: XTRAC LASER
Location #3: elbows
Consent: Written consent obtained, risks reviewed including but not limited to crusting, scabbing, blistering, scarring, darker or lighter pigmentary change, incidental hair removal, bruising, and/or incomplete removal.
Dose Settinge #4 (Mj/Cm2): 540
Post-Care Instructions: I reviewed with the patient in detail post-care instructions. Patient should stay away from the sun and wear sun protection until treated areas are fully healed.
Location #1: hands
Location #2: knees
Location #4: hairline
Total Square Area In Cm2 (Required For Proper Billing): 125
Detail Level: Detailed
Treatment Number: 4
Total Pulses (Will Not Render If 0): 0

## 2020-11-10 ENCOUNTER — APPOINTMENT (RX ONLY)
Dept: URBAN - METROPOLITAN AREA CLINIC 73 | Facility: CLINIC | Age: 65
Setting detail: DERMATOLOGY
End: 2020-11-10

## 2020-11-10 ENCOUNTER — APPOINTMENT (RX ONLY)
Dept: URBAN - METROPOLITAN AREA CLINIC 74 | Facility: CLINIC | Age: 65
Setting detail: DERMATOLOGY
End: 2020-11-10

## 2020-11-10 DIAGNOSIS — L40.0 PSORIASIS VULGARIS: ICD-10-CM

## 2020-11-10 PROCEDURE — ? COUNSELING

## 2020-11-10 PROCEDURE — ? XTRAC LASER

## 2020-11-10 PROCEDURE — 96920 EXCIMER LSR PSRIASIS<250SQCM: CPT

## 2020-11-10 ASSESSMENT — LOCATION SIMPLE DESCRIPTION DERM
LOCATION SIMPLE: LEFT ELBOW
LOCATION SIMPLE: RIGHT KNEE
LOCATION SIMPLE: LEFT HAND
LOCATION SIMPLE: RIGHT ELBOW
LOCATION SIMPLE: RIGHT HAND
LOCATION SIMPLE: LEFT KNEE
LOCATION SIMPLE: LEFT HAND
LOCATION SIMPLE: RIGHT HAND
LOCATION SIMPLE: RIGHT KNEE
LOCATION SIMPLE: LEFT KNEE
LOCATION SIMPLE: RIGHT ELBOW
LOCATION SIMPLE: RIGHT POSTERIOR UPPER ARM
LOCATION SIMPLE: RIGHT POSTERIOR UPPER ARM
LOCATION SIMPLE: LEFT ELBOW

## 2020-11-10 ASSESSMENT — LOCATION DETAILED DESCRIPTION DERM
LOCATION DETAILED: RIGHT DISTAL POSTERIOR UPPER ARM
LOCATION DETAILED: LEFT THENAR EMINENCE
LOCATION DETAILED: RIGHT KNEE
LOCATION DETAILED: RIGHT ULNAR PALM
LOCATION DETAILED: LEFT ELBOW
LOCATION DETAILED: RIGHT THENAR EMINENCE
LOCATION DETAILED: LEFT RADIAL PALM
LOCATION DETAILED: RIGHT THENAR EMINENCE
LOCATION DETAILED: LEFT RADIAL PALM
LOCATION DETAILED: LEFT KNEE
LOCATION DETAILED: RIGHT DISTAL POSTERIOR UPPER ARM
LOCATION DETAILED: RIGHT KNEE
LOCATION DETAILED: LEFT KNEE
LOCATION DETAILED: RIGHT ULNAR PALM
LOCATION DETAILED: RIGHT ELBOW
LOCATION DETAILED: LEFT ELBOW
LOCATION DETAILED: RIGHT ELBOW
LOCATION DETAILED: LEFT THENAR EMINENCE

## 2020-11-10 ASSESSMENT — LOCATION ZONE DERM
LOCATION ZONE: ARM
LOCATION ZONE: LEG
LOCATION ZONE: HAND
LOCATION ZONE: LEG
LOCATION ZONE: ARM
LOCATION ZONE: HAND

## 2020-11-10 NOTE — PROCEDURE: XTRAC LASER
Location #2: knees
Detail Level: Detailed
Dose Setting #1 (Mj/Cm2): 610
Treatment Number: 5
Consent: Written consent obtained, risks reviewed including but not limited to crusting, scabbing, blistering, scarring, darker or lighter pigmentary change, incidental hair removal, bruising, and/or incomplete removal.
Total Pulses (Will Not Render If 0): 0
Location #1: hands
% Increase/Decrease From Last Treatment: 12
Location #4: hairline
Location #3: elbows
Total Square Area In Cm2 (Required For Proper Billing): 128
Post-Care Instructions: I reviewed with the patient in detail post-care instructions. Patient should stay away from the sun and wear sun protection until treated areas are fully healed.

## 2020-11-10 NOTE — PROCEDURE: XTRAC LASER
Dose Setting #1 (Mj/Cm2): 610
Location #2: knees
Post-Care Instructions: I reviewed with the patient in detail post-care instructions. Patient should stay away from the sun and wear sun protection until treated areas are fully healed.
Location #3: elbows
Total Pulses (Will Not Render If 0): 0
Treatment Number: 5
Location #4: hairline
% Increase/Decrease From Last Treatment: 12
Total Square Area In Cm2 (Required For Proper Billing): 128
Location #1: hands
Consent: Written consent obtained, risks reviewed including but not limited to crusting, scabbing, blistering, scarring, darker or lighter pigmentary change, incidental hair removal, bruising, and/or incomplete removal.
Detail Level: Detailed

## 2020-11-12 ENCOUNTER — APPOINTMENT (RX ONLY)
Dept: URBAN - METROPOLITAN AREA CLINIC 74 | Facility: CLINIC | Age: 65
Setting detail: DERMATOLOGY
End: 2020-11-12

## 2020-11-12 ENCOUNTER — APPOINTMENT (RX ONLY)
Dept: URBAN - METROPOLITAN AREA CLINIC 73 | Facility: CLINIC | Age: 65
Setting detail: DERMATOLOGY
End: 2020-11-12

## 2020-11-12 DIAGNOSIS — L40.0 PSORIASIS VULGARIS: ICD-10-CM

## 2020-11-12 PROCEDURE — ? COUNSELING

## 2020-11-12 PROCEDURE — 96920 EXCIMER LSR PSRIASIS<250SQCM: CPT

## 2020-11-12 PROCEDURE — ? XTRAC LASER

## 2020-11-12 ASSESSMENT — LOCATION DETAILED DESCRIPTION DERM
LOCATION DETAILED: LEFT RADIAL PALM
LOCATION DETAILED: RIGHT ELBOW
LOCATION DETAILED: LEFT ELBOW
LOCATION DETAILED: LEFT KNEE
LOCATION DETAILED: LEFT THENAR EMINENCE
LOCATION DETAILED: RIGHT THENAR EMINENCE
LOCATION DETAILED: RIGHT DISTAL POSTERIOR UPPER ARM
LOCATION DETAILED: RIGHT KNEE
LOCATION DETAILED: LEFT RADIAL PALM
LOCATION DETAILED: RIGHT ELBOW
LOCATION DETAILED: RIGHT THENAR EMINENCE
LOCATION DETAILED: RIGHT ULNAR PALM
LOCATION DETAILED: LEFT KNEE
LOCATION DETAILED: RIGHT DISTAL POSTERIOR UPPER ARM
LOCATION DETAILED: LEFT ELBOW
LOCATION DETAILED: RIGHT KNEE
LOCATION DETAILED: RIGHT ULNAR PALM
LOCATION DETAILED: LEFT THENAR EMINENCE

## 2020-11-12 ASSESSMENT — LOCATION SIMPLE DESCRIPTION DERM
LOCATION SIMPLE: RIGHT KNEE
LOCATION SIMPLE: LEFT ELBOW
LOCATION SIMPLE: RIGHT HAND
LOCATION SIMPLE: RIGHT POSTERIOR UPPER ARM
LOCATION SIMPLE: RIGHT POSTERIOR UPPER ARM
LOCATION SIMPLE: RIGHT ELBOW
LOCATION SIMPLE: RIGHT ELBOW
LOCATION SIMPLE: LEFT KNEE
LOCATION SIMPLE: LEFT ELBOW
LOCATION SIMPLE: LEFT KNEE
LOCATION SIMPLE: RIGHT HAND
LOCATION SIMPLE: RIGHT KNEE
LOCATION SIMPLE: LEFT HAND
LOCATION SIMPLE: LEFT HAND

## 2020-11-12 ASSESSMENT — LOCATION ZONE DERM
LOCATION ZONE: HAND
LOCATION ZONE: ARM
LOCATION ZONE: ARM
LOCATION ZONE: LEG
LOCATION ZONE: HAND
LOCATION ZONE: LEG

## 2020-11-12 NOTE — PROCEDURE: XTRAC LASER
Dose Setting #1 (Mj/Cm2): 680
Location #2: knees
Post-Care Instructions: I reviewed with the patient in detail post-care instructions. Patient should stay away from the sun and wear sun protection until treated areas are fully healed.
Location #3: elbows
Total Pulses (Will Not Render If 0): 0
Treatment Number: 6
Location #4: hairline
% Increase/Decrease From Last Treatment: 11
Total Square Area In Cm2 (Required For Proper Billing): 158
Location #1: hands
Consent: Written consent obtained, risks reviewed including but not limited to crusting, scabbing, blistering, scarring, darker or lighter pigmentary change, incidental hair removal, bruising, and/or incomplete removal.
Detail Level: Detailed

## 2020-11-12 NOTE — PROCEDURE: XTRAC LASER
Location #3: elbows
Post-Care Instructions: I reviewed with the patient in detail post-care instructions. Patient should stay away from the sun and wear sun protection until treated areas are fully healed.
% Increase/Decrease From Last Treatment: 11
Location #1: hands
Location #2: knees
Dose Setting #2 (Mj/Cm2): 680
Location #4: hairline
Total Square Area In Cm2 (Required For Proper Billing): 158
Detail Level: Detailed
Treatment Number: 6
Consent: Written consent obtained, risks reviewed including but not limited to crusting, scabbing, blistering, scarring, darker or lighter pigmentary change, incidental hair removal, bruising, and/or incomplete removal.
Total Pulses (Will Not Render If 0): 0

## 2020-12-01 ENCOUNTER — APPOINTMENT (RX ONLY)
Dept: URBAN - METROPOLITAN AREA CLINIC 74 | Facility: CLINIC | Age: 65
Setting detail: DERMATOLOGY
End: 2020-12-01

## 2020-12-01 ENCOUNTER — APPOINTMENT (RX ONLY)
Dept: URBAN - METROPOLITAN AREA CLINIC 73 | Facility: CLINIC | Age: 65
Setting detail: DERMATOLOGY
End: 2020-12-01

## 2020-12-01 DIAGNOSIS — L40.0 PSORIASIS VULGARIS: ICD-10-CM

## 2020-12-01 PROCEDURE — ? XTRAC LASER

## 2020-12-01 PROCEDURE — ? COUNSELING

## 2020-12-01 PROCEDURE — 96920 EXCIMER LSR PSRIASIS<250SQCM: CPT

## 2020-12-01 ASSESSMENT — LOCATION ZONE DERM
LOCATION ZONE: ARM
LOCATION ZONE: HAND
LOCATION ZONE: HAND
LOCATION ZONE: LEG
LOCATION ZONE: ARM
LOCATION ZONE: LEG

## 2020-12-01 ASSESSMENT — LOCATION SIMPLE DESCRIPTION DERM
LOCATION SIMPLE: RIGHT ELBOW
LOCATION SIMPLE: RIGHT HAND
LOCATION SIMPLE: RIGHT KNEE
LOCATION SIMPLE: LEFT KNEE
LOCATION SIMPLE: RIGHT POSTERIOR UPPER ARM
LOCATION SIMPLE: LEFT ELBOW
LOCATION SIMPLE: RIGHT HAND
LOCATION SIMPLE: LEFT HAND
LOCATION SIMPLE: RIGHT ELBOW
LOCATION SIMPLE: RIGHT KNEE
LOCATION SIMPLE: LEFT HAND
LOCATION SIMPLE: LEFT KNEE
LOCATION SIMPLE: RIGHT POSTERIOR UPPER ARM
LOCATION SIMPLE: LEFT ELBOW

## 2020-12-01 ASSESSMENT — LOCATION DETAILED DESCRIPTION DERM
LOCATION DETAILED: RIGHT ELBOW
LOCATION DETAILED: RIGHT THENAR EMINENCE
LOCATION DETAILED: LEFT THENAR EMINENCE
LOCATION DETAILED: RIGHT THENAR EMINENCE
LOCATION DETAILED: RIGHT ULNAR PALM
LOCATION DETAILED: LEFT ELBOW
LOCATION DETAILED: LEFT RADIAL PALM
LOCATION DETAILED: LEFT KNEE
LOCATION DETAILED: LEFT RADIAL PALM
LOCATION DETAILED: LEFT KNEE
LOCATION DETAILED: LEFT ELBOW
LOCATION DETAILED: RIGHT KNEE
LOCATION DETAILED: RIGHT KNEE
LOCATION DETAILED: RIGHT DISTAL POSTERIOR UPPER ARM
LOCATION DETAILED: RIGHT ELBOW
LOCATION DETAILED: RIGHT DISTAL POSTERIOR UPPER ARM
LOCATION DETAILED: RIGHT ULNAR PALM
LOCATION DETAILED: LEFT THENAR EMINENCE

## 2020-12-01 NOTE — PROCEDURE: XTRAC LASER
Location #3: elbows
Post-Care Instructions: I reviewed with the patient in detail post-care instructions. Patient should stay away from the sun and wear sun protection until treated areas are fully healed.
Detail Level: Detailed
Dose Setting #3 (Mj/Cm2): 440
Location #2: knees
Treatment Number: 7
Consent: Written consent obtained, risks reviewed including but not limited to crusting, scabbing, blistering, scarring, darker or lighter pigmentary change, incidental hair removal, bruising, and/or incomplete removal.
Location #1: hands
% Increase/Decrease From Last Treatment: started over due to missing 3 weeks
Location #4: hairline
Total Square Area In Cm2 (Required For Proper Billing): 148
Total Pulses (Will Not Render If 0): 0

## 2020-12-01 NOTE — PROCEDURE: XTRAC LASER
Dose Setting #1 (Mj/Cm2): 440
Location #2: knees
Post-Care Instructions: I reviewed with the patient in detail post-care instructions. Patient should stay away from the sun and wear sun protection until treated areas are fully healed.
Location #3: elbows
Total Pulses (Will Not Render If 0): 0
Treatment Number: 7
Location #4: hairline
% Increase/Decrease From Last Treatment: started over due to missing 3 weeks
Total Square Area In Cm2 (Required For Proper Billing): 148
Location #1: hands
Consent: Written consent obtained, risks reviewed including but not limited to crusting, scabbing, blistering, scarring, darker or lighter pigmentary change, incidental hair removal, bruising, and/or incomplete removal.
Detail Level: Detailed

## 2020-12-03 ENCOUNTER — APPOINTMENT (RX ONLY)
Dept: URBAN - METROPOLITAN AREA CLINIC 73 | Facility: CLINIC | Age: 65
Setting detail: DERMATOLOGY
End: 2020-12-03

## 2020-12-03 ENCOUNTER — APPOINTMENT (RX ONLY)
Dept: URBAN - METROPOLITAN AREA CLINIC 74 | Facility: CLINIC | Age: 65
Setting detail: DERMATOLOGY
End: 2020-12-03

## 2020-12-03 DIAGNOSIS — L40.0 PSORIASIS VULGARIS: ICD-10-CM

## 2020-12-03 PROCEDURE — ? XTRAC LASER

## 2020-12-03 PROCEDURE — 96920 EXCIMER LSR PSRIASIS<250SQCM: CPT

## 2020-12-03 PROCEDURE — ? COUNSELING

## 2020-12-03 ASSESSMENT — LOCATION SIMPLE DESCRIPTION DERM
LOCATION SIMPLE: LEFT KNEE
LOCATION SIMPLE: RIGHT POSTERIOR UPPER ARM
LOCATION SIMPLE: LEFT HAND
LOCATION SIMPLE: RIGHT KNEE
LOCATION SIMPLE: RIGHT HAND
LOCATION SIMPLE: LEFT ELBOW
LOCATION SIMPLE: RIGHT HAND
LOCATION SIMPLE: LEFT KNEE
LOCATION SIMPLE: RIGHT ELBOW
LOCATION SIMPLE: RIGHT ELBOW
LOCATION SIMPLE: RIGHT KNEE
LOCATION SIMPLE: RIGHT POSTERIOR UPPER ARM
LOCATION SIMPLE: LEFT ELBOW
LOCATION SIMPLE: LEFT HAND

## 2020-12-03 ASSESSMENT — LOCATION DETAILED DESCRIPTION DERM
LOCATION DETAILED: LEFT THENAR EMINENCE
LOCATION DETAILED: RIGHT ELBOW
LOCATION DETAILED: LEFT RADIAL PALM
LOCATION DETAILED: LEFT KNEE
LOCATION DETAILED: LEFT ELBOW
LOCATION DETAILED: RIGHT KNEE
LOCATION DETAILED: RIGHT ELBOW
LOCATION DETAILED: RIGHT DISTAL POSTERIOR UPPER ARM
LOCATION DETAILED: LEFT ELBOW
LOCATION DETAILED: LEFT RADIAL PALM
LOCATION DETAILED: RIGHT THENAR EMINENCE
LOCATION DETAILED: RIGHT ULNAR PALM
LOCATION DETAILED: RIGHT KNEE
LOCATION DETAILED: LEFT KNEE
LOCATION DETAILED: RIGHT DISTAL POSTERIOR UPPER ARM
LOCATION DETAILED: LEFT THENAR EMINENCE
LOCATION DETAILED: RIGHT ULNAR PALM
LOCATION DETAILED: RIGHT THENAR EMINENCE

## 2020-12-03 ASSESSMENT — LOCATION ZONE DERM
LOCATION ZONE: LEG
LOCATION ZONE: ARM
LOCATION ZONE: HAND
LOCATION ZONE: HAND
LOCATION ZONE: LEG
LOCATION ZONE: ARM

## 2020-12-03 NOTE — PROCEDURE: XTRAC LASER
Location #1: hands
Dose Setting #3 (Mj/Cm2): 490
Detail Level: Detailed
Location #4: hairline
Total Square Area In Cm2 (Required For Proper Billing): 145
Total Pulses (Will Not Render If 0): 0
Location #3: elbows
Post-Care Instructions: I reviewed with the patient in detail post-care instructions. Patient should stay away from the sun and wear sun protection until treated areas are fully healed.
Location #2: knees
Treatment Number: 8
Consent: Written consent obtained, risks reviewed including but not limited to crusting, scabbing, blistering, scarring, darker or lighter pigmentary change, incidental hair removal, bruising, and/or incomplete removal.

## 2020-12-03 NOTE — PROCEDURE: XTRAC LASER
Dose Setting #1 (Mj/Cm2): 490
Location #2: knees
Post-Care Instructions: I reviewed with the patient in detail post-care instructions. Patient should stay away from the sun and wear sun protection until treated areas are fully healed.
Location #3: elbows
Total Pulses (Will Not Render If 0): 0
Treatment Number: 8
Location #4: hairline
Total Square Area In Cm2 (Required For Proper Billing): 145
Location #1: hands
Consent: Written consent obtained, risks reviewed including but not limited to crusting, scabbing, blistering, scarring, darker or lighter pigmentary change, incidental hair removal, bruising, and/or incomplete removal.
Detail Level: Detailed

## 2023-11-16 NOTE — PROCEDURE: MIPS QUALITY
Quality 110: Preventive Care And Screening: Influenza Immunization: Influenza Immunization Administered during Influenza season
Quality 111:Pneumonia Vaccination Status For Older Adults: Pneumococcal Vaccination Previously Received
Detail Level: Detailed
5